# Patient Record
Sex: FEMALE | Race: OTHER | URBAN - METROPOLITAN AREA
[De-identification: names, ages, dates, MRNs, and addresses within clinical notes are randomized per-mention and may not be internally consistent; named-entity substitution may affect disease eponyms.]

---

## 2021-08-30 ENCOUNTER — OFFICE VISIT (OUTPATIENT)
Dept: URBAN - METROPOLITAN AREA CLINIC 98 | Facility: CLINIC | Age: 31
End: 2021-08-30
Payer: COMMERCIAL

## 2021-08-30 ENCOUNTER — WEB ENCOUNTER (OUTPATIENT)
Dept: URBAN - METROPOLITAN AREA CLINIC 98 | Facility: CLINIC | Age: 31
End: 2021-08-30

## 2021-08-30 ENCOUNTER — LAB OUTSIDE AN ENCOUNTER (OUTPATIENT)
Dept: URBAN - METROPOLITAN AREA CLINIC 98 | Facility: CLINIC | Age: 31
End: 2021-08-30

## 2021-08-30 VITALS
WEIGHT: 127.6 LBS | HEART RATE: 80 BPM | BODY MASS INDEX: 21.78 KG/M2 | HEIGHT: 64 IN | TEMPERATURE: 98 F | DIASTOLIC BLOOD PRESSURE: 72 MMHG | SYSTOLIC BLOOD PRESSURE: 109 MMHG

## 2021-08-30 DIAGNOSIS — Z12.11 COLON CANCER SCREENING: ICD-10-CM

## 2021-08-30 DIAGNOSIS — R10.13 DYSPEPSIA: ICD-10-CM

## 2021-08-30 DIAGNOSIS — R14.2 BELCHING: ICD-10-CM

## 2021-08-30 DIAGNOSIS — K21.9 GERD: ICD-10-CM

## 2021-08-30 PROCEDURE — G9903 PT SCRN TBCO ID AS NON USER: HCPCS | Performed by: INTERNAL MEDICINE

## 2021-08-30 PROCEDURE — G8482 FLU IMMUNIZE ORDER/ADMIN: HCPCS | Performed by: INTERNAL MEDICINE

## 2021-08-30 PROCEDURE — 99214 OFFICE O/P EST MOD 30 MIN: CPT | Performed by: INTERNAL MEDICINE

## 2021-08-30 PROCEDURE — G8427 DOCREV CUR MEDS BY ELIG CLIN: HCPCS | Performed by: INTERNAL MEDICINE

## 2021-08-30 PROCEDURE — G8420 CALC BMI NORM PARAMETERS: HCPCS | Performed by: INTERNAL MEDICINE

## 2021-08-30 PROCEDURE — 3017F COLORECTAL CA SCREEN DOC REV: CPT | Performed by: INTERNAL MEDICINE

## 2021-08-30 RX ORDER — OMEPRAZOLE 40 MG/1
1 CAPSULE 30 MINUTES BEFORE MEALS CAPSULE, DELAYED RELEASE ORAL
Qty: 180 | Refills: 3 | OUTPATIENT
Start: 2021-08-30

## 2021-08-30 NOTE — HPI-TODAY'S VISIT:
Last seen by Dr. Licona at [facility] unremarkable  Today she reports she is botherd by frequent belching.  Noted GES negative in 2016 Notes burning chest pain as well Also with chronic epigastric abd pain that has been worked up in the past Feels food can have a hard time going down Started Omeprazole once a day before breakfast, which has helped a little, but still has symptoms She does drink Coke daily  Eats right before bed

## 2021-10-01 ENCOUNTER — OFFICE VISIT (OUTPATIENT)
Dept: URBAN - METROPOLITAN AREA CLINIC 98 | Facility: CLINIC | Age: 31
End: 2021-10-01

## 2021-10-01 RX ORDER — OMEPRAZOLE 40 MG/1
1 CAPSULE 30 MINUTES BEFORE MEALS CAPSULE, DELAYED RELEASE ORAL
Qty: 180 | Refills: 3 | Status: ACTIVE | COMMUNITY
Start: 2021-08-30

## 2021-10-25 ENCOUNTER — OFFICE VISIT (OUTPATIENT)
Dept: URBAN - METROPOLITAN AREA CLINIC 98 | Facility: CLINIC | Age: 31
End: 2021-10-25

## 2021-11-19 ENCOUNTER — OFFICE VISIT (OUTPATIENT)
Dept: URBAN - METROPOLITAN AREA CLINIC 98 | Facility: CLINIC | Age: 31
End: 2021-11-19
Payer: COMMERCIAL

## 2021-11-19 VITALS
HEART RATE: 82 BPM | DIASTOLIC BLOOD PRESSURE: 81 MMHG | HEIGHT: 64 IN | TEMPERATURE: 98.1 F | SYSTOLIC BLOOD PRESSURE: 114 MMHG | WEIGHT: 130.6 LBS | BODY MASS INDEX: 22.3 KG/M2

## 2021-11-19 DIAGNOSIS — R14.2 BELCHING: ICD-10-CM

## 2021-11-19 DIAGNOSIS — R10.13 DYSPEPSIA: ICD-10-CM

## 2021-11-19 DIAGNOSIS — Z12.11 COLON CANCER SCREENING: ICD-10-CM

## 2021-11-19 DIAGNOSIS — K21.9 GERD: ICD-10-CM

## 2021-11-19 DIAGNOSIS — K92.89 GAS BLOAT SYNDROME: ICD-10-CM

## 2021-11-19 PROCEDURE — 99214 OFFICE O/P EST MOD 30 MIN: CPT | Performed by: INTERNAL MEDICINE

## 2021-11-19 PROCEDURE — G8427 DOCREV CUR MEDS BY ELIG CLIN: HCPCS | Performed by: INTERNAL MEDICINE

## 2021-11-19 PROCEDURE — G8420 CALC BMI NORM PARAMETERS: HCPCS | Performed by: INTERNAL MEDICINE

## 2021-11-19 PROCEDURE — 3017F COLORECTAL CA SCREEN DOC REV: CPT | Performed by: INTERNAL MEDICINE

## 2021-11-19 PROCEDURE — G8482 FLU IMMUNIZE ORDER/ADMIN: HCPCS | Performed by: INTERNAL MEDICINE

## 2021-11-19 PROCEDURE — G9903 PT SCRN TBCO ID AS NON USER: HCPCS | Performed by: INTERNAL MEDICINE

## 2021-11-19 RX ORDER — METRONIDAZOLE 250 MG/1
1 TABLET TABLET ORAL THREE TIMES A DAY
Qty: 42 TABLET | Refills: 0 | OUTPATIENT
Start: 2021-11-19 | End: 2021-12-03

## 2021-11-19 RX ORDER — LANSOPRAZOLE 30 MG/1
1 CAPSULE BEFORE A MEAL CAPSULE, DELAYED RELEASE ORAL BID
Qty: 180 CAPSULE | Refills: 3 | OUTPATIENT
Start: 2021-11-19

## 2021-11-19 RX ORDER — OMEPRAZOLE 40 MG/1
1 CAPSULE 30 MINUTES BEFORE MEALS CAPSULE, DELAYED RELEASE ORAL
Qty: 180 | Refills: 3 | Status: ACTIVE | COMMUNITY
Start: 2021-08-30

## 2021-11-19 NOTE — HPI-TODAY'S VISIT:
f/u visit After last visit, we got CT scan that was unremarkable Started on Omeprazole.  Initially once a day, but now twice daily Omeprazole helps, but she does not want to take medication long term Taking a [place] supplement . Omeprazole helps, but still having symptoms Trying to change habits.  Stopped soda.  Not eating late at night Does report somewhat significant bloating Symptoms do seem worse with stress  . PRIOR VISIT: Last seen by Dr. Licona at [facility] unremarkable  Today she reports she is botherd by frequent belching.  Noted GES negative in 2016 Notes burning chest pain as well Also with chronic epigastric abd pain that has been worked up in the past Feels food can have a hard time going down Started Omeprazole once a day before breakfast, which has helped a little, but still has symptoms She does drink Coke daily  Eats right before bed

## 2021-12-13 ENCOUNTER — OFFICE VISIT (OUTPATIENT)
Dept: URBAN - METROPOLITAN AREA CLINIC 98 | Facility: CLINIC | Age: 31
End: 2021-12-13
Payer: COMMERCIAL

## 2021-12-13 ENCOUNTER — LAB OUTSIDE AN ENCOUNTER (OUTPATIENT)
Dept: URBAN - METROPOLITAN AREA CLINIC 98 | Facility: CLINIC | Age: 31
End: 2021-12-13

## 2021-12-13 VITALS
WEIGHT: 130 LBS | HEART RATE: 77 BPM | BODY MASS INDEX: 22.2 KG/M2 | HEIGHT: 64 IN | TEMPERATURE: 97.9 F | DIASTOLIC BLOOD PRESSURE: 79 MMHG | SYSTOLIC BLOOD PRESSURE: 116 MMHG

## 2021-12-13 DIAGNOSIS — K21.9 GERD: ICD-10-CM

## 2021-12-13 DIAGNOSIS — Z12.11 COLON CANCER SCREENING: ICD-10-CM

## 2021-12-13 DIAGNOSIS — R10.13 DYSPEPSIA: ICD-10-CM

## 2021-12-13 DIAGNOSIS — R14.2 BELCHING: ICD-10-CM

## 2021-12-13 DIAGNOSIS — R07.89 ATYPICAL CHEST PAIN: ICD-10-CM

## 2021-12-13 DIAGNOSIS — K92.89 GAS BLOAT SYNDROME: ICD-10-CM

## 2021-12-13 PROBLEM — 271834000: Status: ACTIVE | Noted: 2021-08-30

## 2021-12-13 PROBLEM — 102589003: Status: ACTIVE | Noted: 2021-12-13

## 2021-12-13 PROBLEM — 162031009: Status: ACTIVE | Noted: 2021-08-30

## 2021-12-13 PROBLEM — 119292006: Status: ACTIVE | Noted: 2021-11-19

## 2021-12-13 PROCEDURE — G8420 CALC BMI NORM PARAMETERS: HCPCS | Performed by: INTERNAL MEDICINE

## 2021-12-13 PROCEDURE — 3017F COLORECTAL CA SCREEN DOC REV: CPT | Performed by: INTERNAL MEDICINE

## 2021-12-13 PROCEDURE — 99214 OFFICE O/P EST MOD 30 MIN: CPT | Performed by: INTERNAL MEDICINE

## 2021-12-13 PROCEDURE — G8482 FLU IMMUNIZE ORDER/ADMIN: HCPCS | Performed by: INTERNAL MEDICINE

## 2021-12-13 PROCEDURE — G9903 PT SCRN TBCO ID AS NON USER: HCPCS | Performed by: INTERNAL MEDICINE

## 2021-12-13 PROCEDURE — G8427 DOCREV CUR MEDS BY ELIG CLIN: HCPCS | Performed by: INTERNAL MEDICINE

## 2021-12-13 RX ORDER — OMEPRAZOLE 40 MG/1
1 CAPSULE 30 MINUTES BEFORE MEALS CAPSULE, DELAYED RELEASE ORAL
Qty: 180 | Refills: 3 | Status: ACTIVE | COMMUNITY
Start: 2021-08-30

## 2021-12-13 RX ORDER — OMEPRAZOLE 40 MG/1
1 CAPSULE 30 MINUTES BEFORE MEALS CAPSULE, DELAYED RELEASE ORAL
Qty: 180 | Refills: 3
Start: 2021-08-30

## 2021-12-13 NOTE — HPI-TODAY'S VISIT:
f/u visit After last visit, we tried a course of Flagyl for possible SIBO, which may have helped bloating She tried Lanzoprazole for about two days, but began to have the chest pressure that she had before so she went back to Omeprazole twice a day. Omeprazole BID does help, but still has symptoms   . PRIOR VISIT: After last visit, we got CT scan that was unremarkable Started on Omeprazole.  Initially once a day, but now twice daily Omeprazole helps, but she does not want to take medication long term Taking a [place] supplement . Omeprazole helps, but still having symptoms Trying to change habits.  Stopped soda.  Not eating late at night Does report somewhat significant bloating Symptoms do seem worse with stress  . PRIOR VISIT: Last seen by Dr. Licona at [facility] unremarkable  Today she reports she is botherd by frequent belching.  Noted GES negative in 2016 Notes burning chest pain as well Also with chronic epigastric abd pain that has been worked up in the past Feels food can have a hard time going down Started Omeprazole once a day before breakfast, which has helped a little, but still has symptoms She does drink Coke daily  Eats right before bed

## 2022-02-11 ENCOUNTER — OFFICE VISIT (OUTPATIENT)
Dept: URBAN - METROPOLITAN AREA MEDICAL CENTER 28 | Facility: MEDICAL CENTER | Age: 32
End: 2022-02-11

## 2022-10-31 ENCOUNTER — TELEPHONE ENCOUNTER (OUTPATIENT)
Dept: URBAN - METROPOLITAN AREA CLINIC 92 | Facility: CLINIC | Age: 32
End: 2022-10-31

## 2022-12-09 ENCOUNTER — TELEPHONE ENCOUNTER (OUTPATIENT)
Dept: URBAN - METROPOLITAN AREA CLINIC 98 | Facility: CLINIC | Age: 32
End: 2022-12-09

## 2022-12-09 ENCOUNTER — OFFICE VISIT (OUTPATIENT)
Dept: URBAN - METROPOLITAN AREA MEDICAL CENTER 28 | Facility: MEDICAL CENTER | Age: 32
End: 2022-12-09

## 2022-12-23 ENCOUNTER — CLAIMS CREATED FROM THE CLAIM WINDOW (OUTPATIENT)
Dept: URBAN - METROPOLITAN AREA MEDICAL CENTER 28 | Facility: MEDICAL CENTER | Age: 32
End: 2022-12-23

## 2022-12-23 ENCOUNTER — OUT OF OFFICE VISIT (OUTPATIENT)
Dept: URBAN - METROPOLITAN AREA MEDICAL CENTER 28 | Facility: MEDICAL CENTER | Age: 32
End: 2022-12-23
Payer: COMMERCIAL

## 2022-12-23 DIAGNOSIS — K21.9 ACID REFLUX: ICD-10-CM

## 2022-12-23 DIAGNOSIS — K22.89 DILATATION OF ESOPHAGUS: ICD-10-CM

## 2022-12-23 DIAGNOSIS — K29.60 ADENOPAPILLOMATOSIS GASTRICA: ICD-10-CM

## 2022-12-23 DIAGNOSIS — K31.89 ACQUIRED DEFORMITY OF DUODENUM: ICD-10-CM

## 2022-12-23 PROCEDURE — 43239 EGD BIOPSY SINGLE/MULTIPLE: CPT | Performed by: INTERNAL MEDICINE

## 2022-12-23 PROCEDURE — 91035 G-ESOPH REFLX TST W/ELECTROD: CPT | Performed by: INTERNAL MEDICINE

## 2022-12-23 RX ORDER — OMEPRAZOLE 40 MG/1
1 CAPSULE 30 MINUTES BEFORE MEALS CAPSULE, DELAYED RELEASE ORAL
Qty: 180 | Refills: 3 | Status: ACTIVE | COMMUNITY
Start: 2021-08-30

## 2023-01-04 ENCOUNTER — TELEPHONE ENCOUNTER (OUTPATIENT)
Dept: URBAN - METROPOLITAN AREA CLINIC 6 | Facility: CLINIC | Age: 33
End: 2023-01-04

## 2023-01-25 PROBLEM — 235595009 GASTROESOPHAGEAL REFLUX DISEASE: Status: ACTIVE | Noted: 2021-08-30

## 2023-01-27 ENCOUNTER — OFFICE VISIT (OUTPATIENT)
Dept: URBAN - METROPOLITAN AREA CLINIC 98 | Facility: CLINIC | Age: 33
End: 2023-01-27
Payer: COMMERCIAL

## 2023-01-27 DIAGNOSIS — K21.9 GERD: ICD-10-CM

## 2023-01-27 DIAGNOSIS — K92.89 GAS BLOAT SYNDROME: ICD-10-CM

## 2023-01-27 DIAGNOSIS — R07.89 ATYPICAL CHEST PAIN: ICD-10-CM

## 2023-01-27 DIAGNOSIS — R14.2 BELCHING: ICD-10-CM

## 2023-01-27 DIAGNOSIS — Z12.11 COLON CANCER SCREENING: ICD-10-CM

## 2023-01-27 DIAGNOSIS — R10.13 DYSPEPSIA: ICD-10-CM

## 2023-01-27 PROCEDURE — G9903 PT SCRN TBCO ID AS NON USER: HCPCS | Performed by: INTERNAL MEDICINE

## 2023-01-27 PROCEDURE — G8420 CALC BMI NORM PARAMETERS: HCPCS | Performed by: INTERNAL MEDICINE

## 2023-01-27 PROCEDURE — G8427 DOCREV CUR MEDS BY ELIG CLIN: HCPCS | Performed by: INTERNAL MEDICINE

## 2023-01-27 PROCEDURE — 99214 OFFICE O/P EST MOD 30 MIN: CPT | Performed by: INTERNAL MEDICINE

## 2023-01-27 PROCEDURE — 3017F COLORECTAL CA SCREEN DOC REV: CPT | Performed by: INTERNAL MEDICINE

## 2023-01-27 PROCEDURE — G8482 FLU IMMUNIZE ORDER/ADMIN: HCPCS | Performed by: INTERNAL MEDICINE

## 2023-01-27 RX ORDER — BACLOFEN 10 MG/1
1 TABLET AS NEEDED TABLET ORAL
Qty: 30 | Refills: 1 | OUTPATIENT
Start: 2023-01-27 | End: 2023-03-28

## 2023-01-27 RX ORDER — LANSOPRAZOLE 30 MG/1
1 CAPSULE BEFORE A MEAL CAPSULE, DELAYED RELEASE ORAL ONCE A DAY
Qty: 30 | OUTPATIENT
Start: 2023-01-27

## 2023-01-27 RX ORDER — OMEPRAZOLE 40 MG/1
1 CAPSULE 30 MINUTES BEFORE MEALS CAPSULE, DELAYED RELEASE ORAL
Qty: 180 | Refills: 3 | Status: ACTIVE | COMMUNITY
Start: 2021-08-30

## 2023-01-27 NOTE — HPI-TODAY'S VISIT:
f/u visit We did EGD with BRAVO 12/2022.  Bx WNL.  BRAVO with Demeester 14.1, but positive SAP.   She is not currently taking any PPI.  She continues to have pressure like chest discomfort and belching.  Can be any time of day.  Can improve after eating.    . PRIOR VISIT: After last visit, we tried a course of Flagyl for possible SIBO, which may have helped bloating She tried Lanzoprazole for about two days, but began to have the chest pressure that she had before so she went back to Omeprazole twice a day. Omeprazole BID does help, but still has symptoms   . PRIOR VISIT: After last visit, we got CT scan that was unremarkable Started on Omeprazole.  Initially once a day, but now twice daily Omeprazole helps, but she does not want to take medication long term Taking a [place] supplement . Omeprazole helps, but still having symptoms Trying to change habits.  Stopped soda.  Not eating late at night Does report somewhat significant bloating Symptoms do seem worse with stress  . PRIOR VISIT: Last seen by Dr. Licona at [facility] unremarkable  Today she reports she is botherd by frequent belching.  Noted GES negative in 2016 Notes burning chest pain as well Also with chronic epigastric abd pain that has been worked up in the past Feels food can have a hard time going down Started Omeprazole once a day before breakfast, which has helped a little, but still has symptoms She does drink Coke daily  Eats right before bed

## 2023-03-08 ENCOUNTER — TELEPHONE ENCOUNTER (OUTPATIENT)
Dept: URBAN - METROPOLITAN AREA CLINIC 98 | Facility: CLINIC | Age: 33
End: 2023-03-08

## 2023-03-08 RX ORDER — LANSOPRAZOLE 30 MG/1
1 CAPSULE BEFORE A MEAL CAPSULE, DELAYED RELEASE ORAL ONCE A DAY
Qty: 30 | Refills: 2

## 2023-04-14 ENCOUNTER — OFFICE VISIT (OUTPATIENT)
Dept: URBAN - METROPOLITAN AREA CLINIC 98 | Facility: CLINIC | Age: 33
End: 2023-04-14
Payer: COMMERCIAL

## 2023-04-14 ENCOUNTER — DASHBOARD ENCOUNTERS (OUTPATIENT)
Age: 33
End: 2023-04-14

## 2023-04-14 VITALS
BODY MASS INDEX: 23.01 KG/M2 | DIASTOLIC BLOOD PRESSURE: 70 MMHG | TEMPERATURE: 98.3 F | HEART RATE: 73 BPM | WEIGHT: 134.8 LBS | HEIGHT: 64 IN | SYSTOLIC BLOOD PRESSURE: 103 MMHG

## 2023-04-14 DIAGNOSIS — R10.13 DYSPEPSIA: ICD-10-CM

## 2023-04-14 DIAGNOSIS — R14.2 BELCHING: ICD-10-CM

## 2023-04-14 DIAGNOSIS — Z12.11 COLON CANCER SCREENING: ICD-10-CM

## 2023-04-14 DIAGNOSIS — K30 FUNCTIONAL DYSPEPSIA: ICD-10-CM

## 2023-04-14 DIAGNOSIS — K92.89 GAS BLOAT SYNDROME: ICD-10-CM

## 2023-04-14 DIAGNOSIS — R07.89 ATYPICAL CHEST PAIN: ICD-10-CM

## 2023-04-14 DIAGNOSIS — K21.9 GERD: ICD-10-CM

## 2023-04-14 PROBLEM — 3696007: Status: ACTIVE | Noted: 2023-04-14

## 2023-04-14 PROCEDURE — 99214 OFFICE O/P EST MOD 30 MIN: CPT | Performed by: INTERNAL MEDICINE

## 2023-04-14 RX ORDER — OMEPRAZOLE 40 MG/1
1 CAPSULE 30 MINUTES BEFORE MEALS CAPSULE, DELAYED RELEASE ORAL
Qty: 180 | Refills: 3 | Status: ACTIVE | COMMUNITY
Start: 2021-08-30

## 2023-04-14 RX ORDER — LANSOPRAZOLE 30 MG/1
1 CAPSULE BEFORE A MEAL CAPSULE, DELAYED RELEASE ORAL ONCE A DAY
Qty: 30 | Refills: 2 | Status: ACTIVE | COMMUNITY

## 2023-04-14 RX ORDER — LANSOPRAZOLE 30 MG/1
1 CAPSULE BEFORE A MEAL CAPSULE, DELAYED RELEASE ORAL ONCE A DAY
Qty: 90 CAPSULE | Refills: 3

## 2023-04-14 NOTE — HPI-TODAY'S VISIT:
f/u visit After last visit we tried Lansoprazole along with Baclofen before bedtime Some days are better than others Worse with stress and when she is hungry  . PRIOR VISIT: We did EGD with BRAVO 12/2022.  Bx WNL.  BRAVO with Demeester 14.1, but positive SAP.   She is not currently taking any PPI.  She continues to have pressure like chest discomfort and belching.  Can be any time of day.  Can improve after eating.    . PRIOR VISIT: After last visit, we tried a course of Flagyl for possible SIBO, which may have helped bloating She tried Lanzoprazole for about two days, but began to have the chest pressure that she had before so she went back to Omeprazole twice a day. Omeprazole BID does help, but still has symptoms   . PRIOR VISIT: After last visit, we got CT scan that was unremarkable Started on Omeprazole.  Initially once a day, but now twice daily Omeprazole helps, but she does not want to take medication long term Taking a [place] supplement . Omeprazole helps, but still having symptoms Trying to change habits.  Stopped soda.  Not eating late at night Does report somewhat significant bloating Symptoms do seem worse with stress  . PRIOR VISIT: Last seen by Dr. Licona at [facility] unremarkable  Today she reports she is botherd by frequent belching.  Noted GES negative in 2016 Notes burning chest pain as well Also with chronic epigastric abd pain that has been worked up in the past Feels food can have a hard time going down Started Omeprazole once a day before breakfast, which has helped a little, but still has symptoms She does drink Coke daily  Eats right before bed

## 2023-07-19 ENCOUNTER — TELEPHONE ENCOUNTER (OUTPATIENT)
Dept: URBAN - METROPOLITAN AREA CLINIC 96 | Facility: CLINIC | Age: 33
End: 2023-07-19

## 2023-10-31 NOTE — PHYSICAL EXAM CONSTITUTIONAL:
Left message for patient at      Telephone Information:   Mobile [phone]    to schedule procedure.  Patient to return call to [facility] Scheduling Patient Line [phone].    Sent contact letter.    pleasant, well nourished, well developed, in no acute distress , normal communication ability